# Patient Record
Sex: FEMALE | Race: WHITE | NOT HISPANIC OR LATINO | URBAN - NONMETROPOLITAN AREA
[De-identification: names, ages, dates, MRNs, and addresses within clinical notes are randomized per-mention and may not be internally consistent; named-entity substitution may affect disease eponyms.]

---

## 2019-09-26 ENCOUNTER — IMPORTED ENCOUNTER (OUTPATIENT)
Dept: URBAN - NONMETROPOLITAN AREA CLINIC 1 | Facility: CLINIC | Age: 24
End: 2019-09-26

## 2019-09-26 PROBLEM — H52.13: Noted: 2019-09-26

## 2019-09-26 PROCEDURE — 92004 COMPRE OPH EXAM NEW PT 1/>: CPT

## 2019-09-26 PROCEDURE — V2520 CONTACT LENS HYDROPHILIC: HCPCS

## 2019-09-26 PROCEDURE — 92310 CONTACT LENS FITTING OU: CPT

## 2019-09-26 PROCEDURE — 92015 DETERMINE REFRACTIVE STATE: CPT

## 2019-10-04 NOTE — PATIENT DISCUSSION
Eye OD, Iol Type TORIC, Post Operative Target PL/-0.50, Package TP/TORIC. Tentative plan, will finalize at [facility] appt.

## 2019-10-04 NOTE — PATIENT DISCUSSION
The risks, benefits and alternatives of cataract surgery were discussed with the patient. Risks including but not limited to: Infection, retinal detachment, lens dislocation, inflammation, loss of vision, increased pressure and need for further surgery. We discussed all the lens options including monofocal lens, toric lens, multifocal lens, astigmatism correction and other options. The patient understands that they may need glasses for optimal vision with any option.

## 2019-10-10 NOTE — PATIENT DISCUSSION
Eye OD, Iol Type Monofocal lens w/ possible LRI, Post Operative Target PL/-0.50, Package TP pkge. OS to follow with distance target.

## 2019-10-10 NOTE — PATIENT DISCUSSION
The risks, benefits and alternatives of cataract surgery were discussed with the patient. Risks including but not limited to: Infection, retinal detachment, lens dislocation, inflammation, loss of vision, increased pressure and need for further surgery. We discussed all the lens options including monofocal lens, toric lens, multifocal lens, astigmatism correction and other options. The patient understands that they may need glasses for optimal vision with any option. Patient does not mind wearing reading glasses.

## 2019-10-10 NOTE — PATIENT DISCUSSION
Patient tried glasses to correct vision, was unable to tolerate due to high myopia OD caused diplopia. OD has shifted more nearsighted due to cataract progression. Mild Astigmatism present OD>OS. After further testing no Toric recommended. May consider ROF and discussed the possibility of glare and halos associated with MF lenses.

## 2019-10-29 NOTE — PATIENT DISCUSSION
Patient tried glasses to correct vision, was unable to tolerate due to high myopia OD caused diplopia. OS has shifted more nearsighted due to cataract progression. Mild Astigmatism present OD>OS. After further testing no Toric recommended. May consider ROF and discussed the possibility of glare and halos associated with MF lenses.

## 2019-11-05 NOTE — PATIENT DISCUSSION
Eye OS, Iol Type Monofocal lens w/ possible LRI, Post Operative Target PL/-0.50, Package TP pkge.
Fundus photos performed today to document disease process and to follow progression.
Monitor.
Patient advised that visual recovery may be limited due to retinal condition.
Patient is doing well post-operatively. The importance of post-op drop compliance was emphasized. Drop schedule reviewed with patient. Patient to call if any visual changes or concerns.
Patient tried glasses to correct vision, was unable to tolerate due to high myopia OD caused diplopia. OS has shifted more nearsighted due to cataract progression. Mild Astigmatism present OD>OS. After further testing no Toric recommended. May consider ROF and discussed the possibility of glare and halos associated with MF lenses.
Recommend AREDS 2 multivitamin supplements.
Recommended Observation.
Referred to see Dr. Annabella Salazar in about 3 weeks for a Retinal Evaluation.  Vitreous Floaters OD.
See Plan #2.
Stable.
Superior/nasal to optic nerve 0.25 DD.
Symptoms of Retinal tear and detachment reviewed. Patient understands to call immediately with any such symptoms.
The patient feels that the cataract is significantly impacting daily activities and has elected cataract surgery. The risks, benefits, and alternatives to surgery were discussed. The patient elects to proceed with surgery.
The risks, benefits and alternatives of cataract surgery were discussed with the patient. Risks including but not limited to: Infection, retinal detachment, lens dislocation, inflammation, loss of vision, increased pressure and need for further surgery. We discussed all the lens options including monofocal lens, toric lens, multifocal lens, astigmatism correction and other options. The patient understands that they may need glasses for optimal vision with any option. Patient does not mind wearing reading glasses.
no joint swelling/no arthritis/no stiffness

## 2019-11-06 NOTE — PATIENT DISCUSSION
Referred to see Dr. Samantha Melendez in about 3 weeks for a Retinal Evaluation.  Vitreous Floaters OD.

## 2019-11-12 NOTE — PATIENT DISCUSSION
Referred to see Dr. Humble Hutchins in about 3 weeks for a Retinal Evaluation.  Vitreous Floaters OD.

## 2020-03-02 NOTE — PATIENT DISCUSSION
Recommend artificial tears 2-3 times daily OU. Patietn c/o burning when putting in artificial tears; recommend to change artificial tears.

## 2020-03-02 NOTE — PATIENT DISCUSSION
Patient is doing well post-operatively. The importance of post-op drop compliance was emphasized. Patient to call if any visual changes or concerns.

## 2021-08-26 NOTE — PATIENT DISCUSSION
The patient feels that the cataract is significantly impacting daily activities and has elected cataract surgery. The risks, benefits, and alternatives to surgery were discussed. The patient elects to proceed with surgery. s/p procedure for kidney stone removal and stent placement (8/23); now c/o chills, back pain and dysuria

## 2021-11-11 ENCOUNTER — IMPORTED ENCOUNTER (OUTPATIENT)
Dept: URBAN - NONMETROPOLITAN AREA CLINIC 1 | Facility: CLINIC | Age: 26
End: 2021-11-11

## 2021-11-11 PROCEDURE — V2520 CONTACT LENS HYDROPHILIC: HCPCS

## 2021-11-11 PROCEDURE — 92015 DETERMINE REFRACTIVE STATE: CPT

## 2021-11-11 PROCEDURE — 92310 CONTACT LENS FITTING OU: CPT

## 2021-11-11 PROCEDURE — 92014 COMPRE OPH EXAM EST PT 1/>: CPT

## 2022-02-14 NOTE — PATIENT DISCUSSION
PCO OD.  Indications, risks, benefits and alternatives to YAG capsulotomy discussed with patient. Questions answered. Educational handout given.

## 2022-03-07 NOTE — PATIENT DISCUSSION
No RT/RD or Heme noted OD today . Recommend following up with retina specialists for further evaluation of floaters .

## 2022-03-18 NOTE — PATIENT DISCUSSION
Explained that floaters will likely decrease in severity with age and ongoing vitreous liquefaction. Patient understands condition, prognosis and need for follow up care.

## 2022-03-18 NOTE — PATIENT DISCUSSION
3/18/22: No retinal holes or tears seen on exam. Recommended OBSERVATION. We reviewed the signs and symptoms of retinal tear/retinal detachment and the importance of prompt evaluation should there be increasing floaters, new flashing lights, or decreasing peripheral vision in either eye at any time. Patient understands condition, prognosis and need for follow up care.

## 2022-03-18 NOTE — PATIENT DISCUSSION
DISCUSSED THE DX, IMAGES, PROGNOSIS AND TX PLAN.  RISK OF VL;: MOD/HIGH. ALL QUESTIONS WERE ANSWERED.

## 2022-04-09 ASSESSMENT — VISUAL ACUITY
OS_CC: J1
OS_SC: 20/25
OD_SC: J1+
OD_SC: J1+
OD_CC: J1+
OS_SC: J1+
OD_CC: J1
OS_CC: J1+
OS_SC: J1+
OD_SC: 20/50
OS_SC: 20/25
OD_SC: 20/50
OS_SC: 20/50

## 2022-04-09 ASSESSMENT — TONOMETRY
OS_IOP_MMHG: 16
OD_IOP_MMHG: 16
